# Patient Record
Sex: FEMALE | Race: WHITE | Employment: FULL TIME | URBAN - METROPOLITAN AREA
[De-identification: names, ages, dates, MRNs, and addresses within clinical notes are randomized per-mention and may not be internally consistent; named-entity substitution may affect disease eponyms.]

---

## 2018-08-08 ENCOUNTER — OFFICE VISIT (OUTPATIENT)
Dept: URGENT CARE | Facility: URGENT CARE | Age: 38
End: 2018-08-08
Payer: COMMERCIAL

## 2018-08-08 VITALS
DIASTOLIC BLOOD PRESSURE: 60 MMHG | OXYGEN SATURATION: 99 % | HEART RATE: 62 BPM | TEMPERATURE: 97.1 F | SYSTOLIC BLOOD PRESSURE: 102 MMHG

## 2018-08-08 DIAGNOSIS — M62.838 MUSCLE SPASMS OF NECK: Primary | ICD-10-CM

## 2018-08-08 PROCEDURE — 99203 OFFICE O/P NEW LOW 30 MIN: CPT | Performed by: PHYSICIAN ASSISTANT

## 2018-08-08 RX ORDER — IBUPROFEN 800 MG/1
800 TABLET, FILM COATED ORAL EVERY 8 HOURS PRN
Qty: 30 TABLET | Refills: 0 | Status: SHIPPED | OUTPATIENT
Start: 2018-08-08

## 2018-08-08 RX ORDER — CYCLOBENZAPRINE HCL 10 MG
10 TABLET ORAL AT BEDTIME
Qty: 30 TABLET | Refills: 1 | Status: SHIPPED | OUTPATIENT
Start: 2018-08-08

## 2018-08-08 NOTE — PATIENT INSTRUCTIONS
Neck Spasm   A spasm of the neck muscles can happen after a sudden awkward neck movement. Sleeping with your neck in a crooked position can also cause spasm. Some people respond to emotional stress by tensing the muscles of their neck, shoulders, and upper back. If neck spasm lasts long enough, it can cause headache.  The treatment described below will usually help the pain to go away in 5 to 7 days. Pain that continues may need further evaluation or other types of treatment such as physical therapy.  Home care    Rest and relax the muscles. Use a comfortable pillow that supports the head and keeps the spine in a neutral position. The position of the head should not be tilted forward or backward. A rolled up towel may help for a custom fit.    Some people find relief with heat. Heat can be applied with either a warm shower or bath or a moist towel heated in the microwave and massage. Others prefer cold packs. You can make an ice pack by filling a plastic bag that seals at the top with ice cubes or crushed ice and then wrapping it with a thin towel. Try both and use the method that feels best for 15 to 20 minutes, several times a day.    Whether using ice or heat, be careful that you do not injure your skin. Never put ice directly on the skin. Always wrap the ice in a towel or other type of cloth. This is very important, especially in people with poor skin sensations.    Try to reduce your stress level. Emotional stress can lead to neck muscle tension and get in the way of or delay the healing process.    You may use over-the-counter pain medicine to control pain, unless another medicine was prescribed.If you have chronic liver or kidney disease or ever had a stomach ulcer or GI bleeding, talk with your healthcare provider before using these medicines.  Follow-up care  Follow up with your healthcare provider if your symptoms do not show signs of improvement after one week. Physical therapy or further tests may be  needed.  If X-rays, CT scans, or MRI scans were taken, you will be told of any new findings that may affect your care.  Call 911  Call 911 if you have:    Sudden weakness or numbness in one or both arms    Neck swelling, difficulty or painful swallowing    Difficulty breathing    Chest pain  When to seek medical advice  Call your healthcare provider right away if any of these occur:    Pain becomes worse or spreads into one or both arms    Increasing headache with nausea or vomiting    Fever of 100.4 F (38 C) or higher, or as directed by your healthcare provider  Date Last Reviewed: 11/21/2015 2000-2017 [employer]. [address] [place] [address]. All rights reserved. This information is not intended as a substitute for professional medical care. Always follow your healthcare professional's instructions.

## 2018-08-08 NOTE — MR AVS SNAPSHOT
After Visit Summary   8/8/2018    Dilcia Brady    MRN: [id number]           Patient Information     Date Of Birth          1980        Visit Information        Provider Department      8/8/2018 9:10 AM Jimmy Alcantara PA-C [facility]        Today's Diagnoses     Muscle spasms of neck    -  1      Care Instructions      Neck Spasm   A spasm of the neck muscles can happen after a sudden awkward neck movement. Sleeping with your neck in a crooked position can also cause spasm. Some people respond to emotional stress by tensing the muscles of their neck, shoulders, and upper back. If neck spasm lasts long enough, it can cause headache.  The treatment described below will usually help the pain to go away in 5 to 7 days. Pain that continues may need further evaluation or other types of treatment such as physical therapy.  Home care    Rest and relax the muscles. Use a comfortable pillow that supports the head and keeps the spine in a neutral position. The position of the head should not be tilted forward or backward. A rolled up towel may help for a custom fit.    Some people find relief with heat. Heat can be applied with either a warm shower or bath or a moist towel heated in the microwave and massage. Others prefer cold packs. You can make an ice pack by filling a plastic bag that seals at the top with ice cubes or crushed ice and then wrapping it with a thin towel. Try both and use the method that feels best for 15 to 20 minutes, several times a day.    Whether using ice or heat, be careful that you do not injure your skin. Never put ice directly on the skin. Always wrap the ice in a towel or other type of cloth. This is very important, especially in people with poor skin sensations.    Try to reduce your stress level. Emotional stress can lead to neck muscle tension and get in the way of or delay the healing process.    You may use over-the-counter pain medicine to  control pain, unless another medicine was prescribed.If you have chronic liver or kidney disease or ever had a stomach ulcer or GI bleeding, talk with your healthcare provider before using these medicines.  Follow-up care  Follow up with your healthcare provider if your symptoms do not show signs of improvement after one week. Physical therapy or further tests may be needed.  If X-rays, CT scans, or MRI scans were taken, you will be told of any new findings that may affect your care.  Call 911  Call 911 if you have:    Sudden weakness or numbness in one or both arms    Neck swelling, difficulty or painful swallowing    Difficulty breathing    Chest pain  When to seek medical advice  Call your healthcare provider right away if any of these occur:    Pain becomes worse or spreads into one or both arms    Increasing headache with nausea or vomiting    Fever of 100.4 F (38 C) or higher, or as directed by your healthcare provider  Date Last Reviewed: 11/21/2015 2000-2017 [employer]. [address] [place] [address]. All rights reserved. This information is not intended as a substitute for professional medical care. Always follow your healthcare professional's instructions.                Follow-ups after your visit        Who to contact     If you have questions or need follow up information about today's clinic visit or your schedule please contact [facility] directly at [phone].  Normal or non-critical lab and imaging results will be communicated to you by MyChart, letter or phone within 4 business days after the clinic has received the results. If you do not hear from us within 7 days, please contact the clinic through MyChart or phone. If you have a critical or abnormal lab result, we will notify you by phone as soon as possible.  Submit refill requests through [employer] or call your pharmacy and they will forward the refill request to us. Please allow 3 business days for  "your refill to be completed.          Additional Information About Your Visit        Similar Pageshart Information     [employer] lets you send messages to your doctor, view your test results, renew your prescriptions, schedule appointments and more. To sign up, go to [email or web address] . Click on \"Log in\" on the left side of the screen, which will take you to the Welcome page. Then click on \"Sign up Now\" on the right side of the page.     You will be asked to enter the access code listed below, as well as some personal information. Please follow the directions to create your username and password.     Your access code is: [email or web address]  Expires: 2018  9:48 AM     Your access code will  in 90 days. If you need help or a new code, please call your [facility] clinic or [phone].        Care EveryWhere ID     This is your Care EveryWhere ID. This could be used by other organizations to access your [facility] medical records  [id number]        Your Vitals Were     Pulse Temperature Pulse Oximetry             62 97.1  F (36.2  C) (Tympanic) 99%          Blood Pressure from Last 3 Encounters:   18 102/60    Weight from Last 3 Encounters:   No data found for Wt              Today, you had the following     No orders found for display         Today's Medication Changes          These changes are accurate as of 18  9:48 AM.  If you have any questions, ask your nurse or doctor.               Start taking these medicines.        Dose/Directions    cyclobenzaprine 10 MG tablet   Commonly known as:  FLEXERIL   Used for:  Muscle spasms of neck        Dose:  10 mg   Take 1 tablet (10 mg) by mouth At Bedtime   Quantity:  30 tablet   Refills:  1       ibuprofen 800 MG tablet   Commonly known as:  ADVIL/MOTRIN   Used for:  Muscle spasms of neck        Dose:  800 mg   Take 1 tablet (800 mg) by mouth every 8 hours as needed for moderate pain   Quantity:  30 tablet   Refills:  0            Where to get your medicines    "   These medications were sent to [facility] [address] [facility] [place] [address]  [address], [place] [address]     Phone:  [phone]     cyclobenzaprine 10 MG tablet    ibuprofen 800 MG tablet                Primary Care Provider Fax #    Physician No Ref-Primary [phone]       No address on file        Equal Access to Services     PETE FRANCISCO : Hadii aad ku hadasho Soomaali, waaxda luqadaha, qaybta kaalmada adeegyada, flor simpsonin hayalekn adeolivia khgerardomeka dewey . So [facility] [phone].    ATENCIÓN: Si habla español, tiene a sal disposición servicios gratuitos de asistencia lingüística. Alaina al [phone].    We comply with applicable federal civil rights laws and [place] laws. We do not discriminate on the basis of race, color, national origin, age, disability, sex, sexual orientation, or gender identity.            Thank you!     Thank you for choosing [facility]  for your care. Our goal is always to provide you with excellent care. Hearing back from our patients is one way we can continue to improve our services. Please take a few minutes to complete the written survey that you may receive in the mail after your visit with us. Thank you!             Your Updated Medication List - Protect others around you: Learn how to safely use, store and throw away your medicines at www.disposemymeds.org.          This list is accurate as of 8/8/18  9:48 AM.  Always use your most recent med list.                   Brand Name Dispense Instructions for use Diagnosis    cyclobenzaprine 10 MG tablet    FLEXERIL    30 tablet    Take 1 tablet (10 mg) by mouth At Bedtime    Muscle spasms of neck       ibuprofen 800 MG tablet    ADVIL/MOTRIN    30 tablet    Take 1 tablet (800 mg) by mouth every 8 hours as needed for moderate pain    Muscle spasms of neck

## 2018-08-08 NOTE — PROGRESS NOTES
SUBJECTIVE  HPI: Dilcia Brady is a 38 year old female who presents for evaluation of back pain of trapezius and paraspinals. Symptoms began 2 week(s) ago, have been onset acute and are improving until she worked out today.  Pain is located in the uppe ack region, with radiation to does not radiate, and are at worst a 6 on a scale of 1-10.  Recent injury:recent heavy lifting  Personal hx of back pain is no prior back problems.  Pain is exacerbated by: lifting.  Pain is relieved by: ice and rest[unfilled] sx include: denies.  Red flag symptoms: negative.    No history of recurrent back pain, osteoporosis, back surgery    No past medical history on file.  No current outpatient prescriptions on file.     Social History   Substance Use Topics     Smoking status: Not on file     Smokeless tobacco: Not on file     Alcohol use Not on file       ROS:  10 point ROS negative except as listed above      OBJECTIVE:  /60 (BP Location: Right arm, Patient Position: Chair, Cuff Size: Adult Regular)  Pulse 62  Temp 97.1  F (36.2  C) (Tympanic)  SpO2 99%  Back examination: Back symmetric, no curvature. ROM normal. No CVA tenderness.  Tender tense trapezius  GENERAL APPEARANCE: healthy, alert and no distress  RESP: no labored breathing  NEURO: Normal strength and tone with no weakness or sensory deficit noted, reflexes normal   SKIN: no suspicious lesions or rashes    ASSESSMENT/IMPRESSION:  (M62.838) Muscle spasms of neck  (primary encounter diagnosis)  Plan: ibuprofen (ADVIL/MOTRIN) 800 MG tablet,         cyclobenzaprine (FLEXERIL) 10 MG tablet      Patient Instructions     Neck Spasm   A spasm of the neck muscles can happen after a sudden awkward neck movement. Sleeping with your neck in a crooked position can also cause spasm. Some people respond to emotional stress by tensing the muscles of their neck, shoulders, and upper back. If neck spasm lasts long enough, it can cause headache.  The treatment described  below will usually help the pain to go away in 5 to 7 days. Pain that continues may need further evaluation or other types of treatment such as physical therapy.  Home care    Rest and relax the muscles. Use a comfortable pillow that supports the head and keeps the spine in a neutral position. The position of the head should not be tilted forward or backward. A rolled up towel may help for a custom fit.    Some people find relief with heat. Heat can be applied with either a warm shower or bath or a moist towel heated in the microwave and massage. Others prefer cold packs. You can make an ice pack by filling a plastic bag that seals at the top with ice cubes or crushed ice and then wrapping it with a thin towel. Try both and use the method that feels best for 15 to 20 minutes, several times a day.    Whether using ice or heat, be careful that you do not injure your skin. Never put ice directly on the skin. Always wrap the ice in a towel or other type of cloth. This is very important, especially in people with poor skin sensations.    Try to reduce your stress level. Emotional stress can lead to neck muscle tension and get in the way of or delay the healing process.    You may use over-the-counter pain medicine to control pain, unless another medicine was prescribed.If you have chronic liver or kidney disease or ever had a stomach ulcer or GI bleeding, talk with your healthcare provider before using these medicines.  Follow-up care  Follow up with your healthcare provider if your symptoms do not show signs of improvement after one week. Physical therapy or further tests may be needed.  If X-rays, CT scans, or MRI scans were taken, you will be told of any new findings that may affect your care.  Call 911  Call 911 if you have:    Sudden weakness or numbness in one or both arms    Neck swelling, difficulty or painful swallowing    Difficulty breathing    Chest pain  When to seek medical advice  Call your healthcare  provider right away if any of these occur:    Pain becomes worse or spreads into one or both arms    Increasing headache with nausea or vomiting    Fever of 100.4 F (38 C) or higher, or as directed by your healthcare provider  Date Last Reviewed: 11/21/2015 2000-2017 [employer]. [address] [place] [address]. All rights reserved. This information is not intended as a substitute for professional medical care. Always follow your healthcare professional's instructions.